# Patient Record
Sex: MALE | Race: WHITE | NOT HISPANIC OR LATINO | Employment: FULL TIME | URBAN - METROPOLITAN AREA
[De-identification: names, ages, dates, MRNs, and addresses within clinical notes are randomized per-mention and may not be internally consistent; named-entity substitution may affect disease eponyms.]

---

## 2017-05-15 ENCOUNTER — HOSPITAL ENCOUNTER (OUTPATIENT)
Dept: LAB | Facility: MEDICAL CENTER | Age: 52
End: 2017-05-15
Attending: INTERNAL MEDICINE
Payer: COMMERCIAL

## 2017-05-15 DIAGNOSIS — E78.5 DYSLIPIDEMIA: Chronic | ICD-10-CM

## 2017-05-15 DIAGNOSIS — E11.9 CONTROLLED TYPE 2 DIABETES MELLITUS WITHOUT COMPLICATION, WITH LONG-TERM CURRENT USE OF INSULIN (HCC): ICD-10-CM

## 2017-05-15 DIAGNOSIS — Z79.4 CONTROLLED TYPE 2 DIABETES MELLITUS WITHOUT COMPLICATION, WITH LONG-TERM CURRENT USE OF INSULIN (HCC): ICD-10-CM

## 2017-05-15 LAB
ALBUMIN SERPL BCP-MCNC: 4.9 G/DL (ref 3.2–4.9)
ALBUMIN/GLOB SERPL: 1.7 G/DL
ALP SERPL-CCNC: 55 U/L (ref 30–99)
ALT SERPL-CCNC: 23 U/L (ref 2–50)
ANION GAP SERPL CALC-SCNC: 9 MMOL/L (ref 0–11.9)
AST SERPL-CCNC: 17 U/L (ref 12–45)
BILIRUB SERPL-MCNC: 0.5 MG/DL (ref 0.1–1.5)
BUN SERPL-MCNC: 11 MG/DL (ref 8–22)
CALCIUM SERPL-MCNC: 10.7 MG/DL (ref 8.5–10.5)
CHLORIDE SERPL-SCNC: 102 MMOL/L (ref 96–112)
CHOLEST SERPL-MCNC: 122 MG/DL (ref 100–199)
CO2 SERPL-SCNC: 26 MMOL/L (ref 20–33)
CREAT SERPL-MCNC: 1.01 MG/DL (ref 0.5–1.4)
CREAT UR-MCNC: 38.2 MG/DL
EST. AVERAGE GLUCOSE BLD GHB EST-MCNC: 154 MG/DL
GFR SERPL CREATININE-BSD FRML MDRD: >60 ML/MIN/1.73 M 2
GLOBULIN SER CALC-MCNC: 2.9 G/DL (ref 1.9–3.5)
GLUCOSE SERPL-MCNC: 122 MG/DL (ref 65–99)
HBA1C MFR BLD: 7 % (ref 0–5.6)
HDLC SERPL-MCNC: 42 MG/DL
LDLC SERPL CALC-MCNC: 58 MG/DL
MICROALBUMIN UR-MCNC: <0.7 MG/DL
MICROALBUMIN/CREAT UR: NORMAL MG/G (ref 0–30)
POTASSIUM SERPL-SCNC: 4.6 MMOL/L (ref 3.6–5.5)
PROT SERPL-MCNC: 7.8 G/DL (ref 6–8.2)
SODIUM SERPL-SCNC: 137 MMOL/L (ref 135–145)
TRIGL SERPL-MCNC: 111 MG/DL (ref 0–149)

## 2017-05-15 PROCEDURE — 36415 COLL VENOUS BLD VENIPUNCTURE: CPT

## 2017-05-15 PROCEDURE — 82570 ASSAY OF URINE CREATININE: CPT

## 2017-05-15 PROCEDURE — 80061 LIPID PANEL: CPT

## 2017-05-15 PROCEDURE — 82043 UR ALBUMIN QUANTITATIVE: CPT

## 2017-05-15 PROCEDURE — 80053 COMPREHEN METABOLIC PANEL: CPT

## 2017-05-15 PROCEDURE — 83036 HEMOGLOBIN GLYCOSYLATED A1C: CPT

## 2017-05-19 ENCOUNTER — OFFICE VISIT (OUTPATIENT)
Dept: MEDICAL GROUP | Facility: MEDICAL CENTER | Age: 52
End: 2017-05-19
Payer: COMMERCIAL

## 2017-05-19 VITALS
WEIGHT: 265 LBS | DIASTOLIC BLOOD PRESSURE: 64 MMHG | OXYGEN SATURATION: 100 % | HEART RATE: 82 BPM | RESPIRATION RATE: 16 BRPM | TEMPERATURE: 98.2 F | SYSTOLIC BLOOD PRESSURE: 122 MMHG | BODY MASS INDEX: 37.94 KG/M2 | HEIGHT: 70 IN

## 2017-05-19 DIAGNOSIS — I10 HTN (HYPERTENSION), BENIGN: ICD-10-CM

## 2017-05-19 DIAGNOSIS — E11.9 CONTROLLED TYPE 2 DIABETES MELLITUS WITHOUT COMPLICATION, WITH LONG-TERM CURRENT USE OF INSULIN (HCC): ICD-10-CM

## 2017-05-19 DIAGNOSIS — E78.5 DYSLIPIDEMIA: Chronic | ICD-10-CM

## 2017-05-19 DIAGNOSIS — E83.52 HYPERCALCEMIA: ICD-10-CM

## 2017-05-19 DIAGNOSIS — Z79.4 CONTROLLED TYPE 2 DIABETES MELLITUS WITHOUT COMPLICATION, WITH LONG-TERM CURRENT USE OF INSULIN (HCC): ICD-10-CM

## 2017-05-19 PROCEDURE — 99214 OFFICE O/P EST MOD 30 MIN: CPT | Performed by: INTERNAL MEDICINE

## 2017-05-19 NOTE — PROGRESS NOTES
CC: Follow-up multiple issues    HPI:   Avinash presents today with the following.    1. Controlled type 2 diabetes mellitus without complication, with long-term current use of insulin (CMS-HCC)  Presents showing good control diabetes with A1c of 7.0 maintain on 3 drug regimen without side effects.    2. Hypercalcemia  Calcium was slightly elevated 10.7. He's never had abnormalities in the past. Denying any significant symptoms associated.    3. HTN (hypertension), benign  Blood pressure well controlled on current medication denying any chest pain or shortness of breath no edema.    4. Dyslipidemia  Maintain onset and without myalgia good control cholesterol.          Patient Active Problem List    Diagnosis Date Noted   • Erectile dysfunction due to arterial insufficiency 11/18/2016   • BMI 39.0-39.9,adult 10/13/2015   • AR (allergic rhinitis) 07/11/2012   • HTN (hypertension), benign 03/05/2012   • Controlled type 2 diabetes mellitus without complication, with long-term current use of insulin (CMS-HCC) 04/30/2010   • Dyslipidemia 04/30/2010   • LAMAR (obstructive sleep apnea) 04/30/2010       Current Outpatient Prescriptions   Medication Sig Dispense Refill   • metformin (GLUCOPHAGE) 1000 MG tablet Take 1 tablet by mouth two  times daily with meals 180 Tab 3   • lisinopril (PRINIVIL, ZESTRIL) 40 MG tablet Take 1 tablet by mouth  daily 90 Tab 3   • VICTOZA 18 MG/3ML Solution Pen-injector injection Inject 1.8mg subcutaneously as instructed every day. 27 mL 6   • ONETOUCH VERIO strip      • sildenafil citrate (VIAGRA) 100 MG tablet Take 1 Tab by mouth as needed for Erectile Dysfunction. 6 Tab 6   • Insulin Pen Needle (B-D UF III MINI PEN NEEDLES) 31G X 5 MM Misc 1 Each by Does not apply route every day. 300 Each 3   • VENTOLIN  (90 BASE) MCG/ACT Aero Soln inhalation aerosol Use 2 puffs every 6 hours  as needed for shortness of  breath 72 g 3   • Blood Glucose Monitoring Suppl SUPPLIES Misc Test strips order:  "Test strips for One Touch Verio IQ meter. Sig: use bid and prn ssx high or low sugar #100 RF x 11 100 Each 11   • Empagliflozin (JARDIANCE) 25 MG Tab Take 25 mg by mouth every day. 30 Tab 6   • simvastatin (ZOCOR) 40 MG Tab Take 1 tablet by mouth  every evening 90 Tab 3   • clobetasol (TEMOVATE) 0.05 % CREA Apply 1 Application to affected area(s) 2 times a day. 180 g 3     No current facility-administered medications for this visit.         Allergies as of 05/19/2017 - Vega as Reviewed 05/19/2017   Allergen Reaction Noted   • Codeine Anaphylaxis 04/30/2010        ROS: As per HPI.    /64 mmHg  Pulse 82  Temp(Src) 36.8 °C (98.2 °F)  Resp 16  Ht 1.778 m (5' 10\")  Wt 120.203 kg (265 lb)  BMI 38.02 kg/m2  SpO2 100%    Physical Exam:  Gen:         Alert and oriented, No apparent distress.  Neck:        No Lymphadenopathy or Bruits.  Lungs:     Clear to auscultation bilaterally  CV:          Regular rate and rhythm. No murmurs, rubs or gallops.               Ext:          No clubbing, cyanosis, edema.      Assessment and Plan.   51 y.o. male with the following issues.    1. Controlled type 2 diabetes mellitus without complication, with long-term current use of insulin (CMS-[facility])  Currently well controlled no changes. Discussed diet and exercise recheck A1c in 6 months. Reminded about yearly eye exam.  - HEMOGLOBIN A1C; Future    2. Hypercalcemia  Sending for repeat with PTH  - COMP METABOLIC PANEL; Future  - PTH INTACT; Future    3. HTN (hypertension), benign  Currently well controlled, Discuss diet, exercise and salt restriction.    4. Dyslipidemia  Lipids currently well controlled. Discussed continued diet and exercise recheck 6 months to 1 year.            "

## 2017-05-19 NOTE — MR AVS SNAPSHOT
"        Avinash Woo   2017 10:00 AM   Office Visit   MRN: [id number]    Department:  [address] [place] [address]   Dept Phone:  [phone]    Description:  Male : 1965   Provider:  Femi Shelton M.D.           Reason for Visit     Follow-Up Labs      Allergies as of 2017     Allergen Noted Reactions    Codeine 2010   Anaphylaxis      You were diagnosed with     Controlled type 2 diabetes mellitus without complication, with long-term current use of insulin (CMS-HCC)   [2255366]       Hypercalcemia   [275.42.ICD-9-CM]       HTN (hypertension), benign   [996980]       Dyslipidemia   [756955]       Need for Tdap vaccination   [571587]         Vital Signs     Blood Pressure Pulse Temperature Respirations Height Weight    122/64 mmHg 82 36.8 °C (98.2 °F) 16 1.778 m (5' 10\") 120.203 kg (265 lb)    Body Mass Index Oxygen Saturation Smoking Status             38.02 kg/m2 100% Never Smoker          Basic Information     Date Of Birth Sex Race Ethnicity Preferred Language    1965 Male White Non- English      Your appointments     2017  8:20 AM   Diabetes Care Visit with Femi Shelton M.D., ALEXANDRE DIABETES RN   [facility] [address]    [facility] [address] [place] [phone]           You will be receiving a confirmation call a few days before your appointment from our automated call confirmation system.              Problem List              ICD-10-CM Priority Class Noted - Resolved    Controlled type 2 diabetes mellitus without complication, with long-term current use of insulin (CMS-HCC) E11.9, Z79.4   2010 - Present    Dyslipidemia (Chronic) E78.5   2010 - Present    LAMAR (obstructive sleep apnea) (Chronic) G47.33   2010 - Present    HTN (hypertension), benign I10   3/5/2012 - Present    AR (allergic rhinitis) J30.9   2012 - Present    BMI 39.0-39.9,adult Z68.39   10/13/2015 - Present    Erectile dysfunction due to " arterial insufficiency N52.01   11/18/2016 - Present      Health Maintenance        Date Due Completion Dates    IMM HEP B VACCINE (1 of 3 - Primary Series) 1965 ---    IMM DTaP/Tdap/Td Vaccine (1 - Tdap) 11/24/1983 ---    DIABETES MONOFILAMENT / LE EXAM 8/12/2017 8/12/2016, 7/17/2015, 1/23/2014, 7/11/2012 (Done)    Override on 7/11/2012: Done    RETINAL SCREENING 10/13/2017 10/13/2016, 10/23/2015, 9/18/2014    A1C SCREENING 11/15/2017 5/15/2017, 11/15/2016, 3/30/2016, 7/16/2015, 12/12/2014, 6/27/2014, 1/9/2014, 10/24/2013, 1/18/2013 (Done), 6/19/2012 (Done), 8/16/2011    Override on 1/18/2013: Done (8.0)    Override on 6/19/2012: Done    FASTING LIPID PROFILE 5/15/2018 5/15/2017, 3/30/2016, 12/12/2014, 1/9/2014, 10/24/2013, 6/19/2012 (Done), 8/16/2011    Override on 6/19/2012: Done    URINE ACR / MICROALBUMIN 5/15/2018 5/15/2017, 3/30/2016, 12/12/2014, 1/9/2014, 10/24/2013, 1/28/2013 (Done), 7/11/2012 (Done)    Override on 1/28/2013: Done    Override on 7/11/2012: Done    SERUM CREATININE 5/15/2018 5/15/2017, 3/30/2016, 12/12/2014, 1/9/2014, 10/24/2013, 1/28/2013 (Done), 6/19/2012 (Done), 8/16/2011    Override on 1/28/2013: Done    Override on 6/19/2012: Done    COLONOSCOPY 2/6/2020 2/6/2015            Current Immunizations     Influenza TIV (IM) 10/20/2016, 10/9/2012, 11/19/2011    Influenza Vaccine Quad Inj (Preserved) 12/24/2014    Pneumococcal polysaccharide vaccine (PPSV-23) 1/23/2014  3:00 PM      Below and/or attached are the medications your provider expects you to take. Review all of your home medications and newly ordered medications with your provider and/or pharmacist. Follow medication instructions as directed by your provider and/or pharmacist. Please keep your medication list with you and share with your provider. Update the information when medications are discontinued, doses are changed, or new medications (including over-the-counter products) are added; and carry medication information at  all times in the event of emergency situations     Allergies:  CODEINE - Anaphylaxis               Medications  Valid as of: May 19, 2017 - 10:28 AM    Generic Name Brand Name Tablet Size Instructions for use    Albuterol Sulfate (Aero Soln) VENTOLIN  (90 BASE) MCG/ACT Use 2 puffs every 6 hours  as needed for shortness of  breath        Blood Glucose Monitoring Suppl (Misc) Blood Glucose Monitoring Suppl SUPPLIES Test strips order: Test strips for One Touch Verio IQ meter. Sig: use bid and prn ssx high or low sugar #100 RF x 11        Clobetasol Propionate (Cream) TEMOVATE 0.05 % Apply 1 Application to affected area(s) 2 times a day.        Empagliflozin (Tab) Empagliflozin 25 MG Take 25 mg by mouth every day.        Glucose Blood (Strip) ONETOUCH VERIO          Insulin Pen Needle (Misc) Insulin Pen Needle 31G X 5 MM 1 Each by Does not apply route every day.        Liraglutide (Solution Pen-injector) VICTOZA 18 MG/3ML Inject 1.8mg subcutaneously as instructed every day.        Lisinopril (Tab) PRINIVIL, ZESTRIL 40 MG Take 1 tablet by mouth  daily        MetFORMIN HCl (Tab) GLUCOPHAGE 1000 MG Take 1 tablet by mouth two  times daily with meals        Sildenafil Citrate (Tab) VIAGRA 100 MG Take 1 Tab by mouth as needed for Erectile Dysfunction.        Simvastatin (Tab) ZOCOR 40 MG Take 1 tablet by mouth  every evening        .                 Medicines prescribed today were sent to:     [facility] - [address] [facility] [address]    Phone: [phone] Fax: [phone]    Open 24 Hours?: No    [facility] #[id number] - [address] AT [address] [address]    Phone: [phone] Fax: [phone]    Open 24 Hours?: No      Medication refill instructions:       If your prescription bottle indicates you have medication refills left, it is not necessary to call your provider’s office. Please contact your  pharmacy and they will refill your medication.    If your prescription bottle indicates you do not have any refills left, you may request refills at any time through one of the following ways: The online [employer] system (except Urgent Care), by calling your provider’s office, or by asking your pharmacy to contact your provider’s office with a refill request. Medication refills are processed only during regular business hours and may not be available until the next business day. Your provider may request additional information or to have a follow-up visit with you prior to refilling your medication.   *Please Note: Medication refills are assigned a new Rx number when refilled electronically. Your pharmacy may indicate that no refills were authorized even though a new prescription for the same medication is available at the pharmacy. Please request the medicine by name with the pharmacy before contacting your provider for a refill.        Your To Do List     Future Labs/Procedures Complete By Expires    COMP METABOLIC PANEL  As directed 5/20/2018    HEMOGLOBIN A1C  As directed 5/20/2018    PTH INTACT  As directed 5/20/2018         [employer] Access Code: Activation code not generated  Current [employer] Status: Active

## 2017-08-28 DIAGNOSIS — E78.5 DYSLIPIDEMIA: Chronic | ICD-10-CM

## 2017-08-28 RX ORDER — SIMVASTATIN 40 MG
40 TABLET ORAL EVERY EVENING
Qty: 90 TAB | Refills: 3 | Status: SHIPPED | OUTPATIENT
Start: 2017-08-28 | End: 2018-08-23 | Stop reason: SDUPTHER

## 2018-02-15 RX ORDER — EMPAGLIFLOZIN 25 MG/1
TABLET, FILM COATED ORAL
Qty: 90 TAB | Refills: 9 | Status: SHIPPED | OUTPATIENT
Start: 2018-02-15 | End: 2018-05-23 | Stop reason: SDUPTHER

## 2018-02-15 RX ORDER — FLURBIPROFEN SODIUM 0.3 MG/ML
SOLUTION/ DROPS OPHTHALMIC
Qty: 90 EACH | Refills: 11 | Status: SHIPPED | OUTPATIENT
Start: 2018-02-15

## 2018-02-15 RX ORDER — LIRAGLUTIDE 6 MG/ML
INJECTION SUBCUTANEOUS
Qty: 27 ML | Refills: 0 | Status: SHIPPED | OUTPATIENT
Start: 2018-02-15 | End: 2018-03-27 | Stop reason: SDUPTHER

## 2018-02-15 RX ORDER — ALBUTEROL SULFATE 90 UG/1
AEROSOL, METERED RESPIRATORY (INHALATION)
Qty: 54 G | Refills: 11 | Status: SHIPPED | OUTPATIENT
Start: 2018-02-15 | End: 2019-10-03 | Stop reason: SDUPTHER

## 2018-02-15 RX ORDER — LISINOPRIL 40 MG/1
TABLET ORAL
Qty: 90 TAB | Refills: 0 | Status: SHIPPED | OUTPATIENT
Start: 2018-02-15 | End: 2018-04-08 | Stop reason: SDUPTHER

## 2018-04-09 RX ORDER — LISINOPRIL 40 MG/1
TABLET ORAL
Qty: 30 TAB | Refills: 0 | Status: SHIPPED | OUTPATIENT
Start: 2018-04-09 | End: 2018-05-23 | Stop reason: SDUPTHER

## 2018-04-13 ENCOUNTER — HOSPITAL ENCOUNTER (OUTPATIENT)
Dept: LAB | Facility: MEDICAL CENTER | Age: 53
End: 2018-04-13
Attending: INTERNAL MEDICINE
Payer: COMMERCIAL

## 2018-04-13 DIAGNOSIS — E83.52 HYPERCALCEMIA: ICD-10-CM

## 2018-04-13 DIAGNOSIS — Z79.4 CONTROLLED TYPE 2 DIABETES MELLITUS WITHOUT COMPLICATION, WITH LONG-TERM CURRENT USE OF INSULIN (HCC): ICD-10-CM

## 2018-04-13 DIAGNOSIS — E11.9 CONTROLLED TYPE 2 DIABETES MELLITUS WITHOUT COMPLICATION, WITH LONG-TERM CURRENT USE OF INSULIN (HCC): ICD-10-CM

## 2018-04-13 DIAGNOSIS — E78.5 DYSLIPIDEMIA: Chronic | ICD-10-CM

## 2018-04-13 LAB
ALBUMIN SERPL BCP-MCNC: 4.6 G/DL (ref 3.2–4.9)
ALBUMIN/GLOB SERPL: 1.7 G/DL
ALP SERPL-CCNC: 59 U/L (ref 30–99)
ALT SERPL-CCNC: 26 U/L (ref 2–50)
ANION GAP SERPL CALC-SCNC: 11 MMOL/L (ref 0–11.9)
AST SERPL-CCNC: 19 U/L (ref 12–45)
BILIRUB SERPL-MCNC: 0.4 MG/DL (ref 0.1–1.5)
BUN SERPL-MCNC: 17 MG/DL (ref 8–22)
CALCIUM SERPL-MCNC: 9.6 MG/DL (ref 8.5–10.5)
CHLORIDE SERPL-SCNC: 102 MMOL/L (ref 96–112)
CO2 SERPL-SCNC: 23 MMOL/L (ref 20–33)
CREAT SERPL-MCNC: 0.91 MG/DL (ref 0.5–1.4)
EST. AVERAGE GLUCOSE BLD GHB EST-MCNC: 186 MG/DL
GLOBULIN SER CALC-MCNC: 2.7 G/DL (ref 1.9–3.5)
GLUCOSE SERPL-MCNC: 146 MG/DL (ref 65–99)
HBA1C MFR BLD: 8.1 % (ref 0–5.6)
POTASSIUM SERPL-SCNC: 4.2 MMOL/L (ref 3.6–5.5)
PROT SERPL-MCNC: 7.3 G/DL (ref 6–8.2)
PTH-INTACT SERPL-MCNC: 61.7 PG/ML (ref 14–72)
SODIUM SERPL-SCNC: 136 MMOL/L (ref 135–145)

## 2018-04-13 PROCEDURE — 36415 COLL VENOUS BLD VENIPUNCTURE: CPT

## 2018-04-13 PROCEDURE — 83970 ASSAY OF PARATHORMONE: CPT

## 2018-04-13 PROCEDURE — 83036 HEMOGLOBIN GLYCOSYLATED A1C: CPT

## 2018-04-13 PROCEDURE — 80053 COMPREHEN METABOLIC PANEL: CPT

## 2018-04-23 ENCOUNTER — OFFICE VISIT (OUTPATIENT)
Dept: MEDICAL GROUP | Facility: MEDICAL CENTER | Age: 53
End: 2018-04-23
Payer: COMMERCIAL

## 2018-04-23 VITALS
HEIGHT: 70 IN | BODY MASS INDEX: 39.08 KG/M2 | WEIGHT: 273 LBS | HEART RATE: 74 BPM | TEMPERATURE: 98.6 F | SYSTOLIC BLOOD PRESSURE: 124 MMHG | RESPIRATION RATE: 16 BRPM | OXYGEN SATURATION: 97 % | DIASTOLIC BLOOD PRESSURE: 78 MMHG

## 2018-04-23 DIAGNOSIS — E11.65 TYPE 2 DIABETES MELLITUS WITH HYPERGLYCEMIA, WITHOUT LONG-TERM CURRENT USE OF INSULIN (HCC): ICD-10-CM

## 2018-04-23 DIAGNOSIS — E78.5 DYSLIPIDEMIA: Chronic | ICD-10-CM

## 2018-04-23 DIAGNOSIS — I10 HTN (HYPERTENSION), BENIGN: ICD-10-CM

## 2018-04-23 DIAGNOSIS — G47.33 OSA (OBSTRUCTIVE SLEEP APNEA): Chronic | ICD-10-CM

## 2018-04-23 PROCEDURE — 99214 OFFICE O/P EST MOD 30 MIN: CPT | Performed by: FAMILY MEDICINE

## 2018-04-23 ASSESSMENT — PATIENT HEALTH QUESTIONNAIRE - PHQ9: CLINICAL INTERPRETATION OF PHQ2 SCORE: 0

## 2018-04-23 NOTE — PROGRESS NOTES
cc: Diabetes    Subjective:     Avinash Woo is a 52 y.o. male presenting to discuss his blood sugars. His recent A1c was 8.1. Blood sugars at home have been in the 120s to 140s fasting. His after meal blood sugars range in the 150s to 200s. Denies any hypoglycemic episodes, no side effects from meds. He is currently taking metformin 1000 mg twice a day, jardiance 25 mg daily, Victoza 1.8 mg weekly. He reports that the Victoza is now going to cost him $1000 every 3 months.  His eating habits have been more irregular lately, eating out more often as he has been traveling. He also now works the night shift and his sleep schedule is irregular. He does have a history of sleep apnea, but is untreated for reasons unclear.    Review of systems:  See above.         Current Outpatient Prescriptions:   •  insulin glargine (LANTUS SOLOSTAR) 100 UNIT/ML Solution Pen-injector injection, Inject 10 Units as instructed every evening., Disp: 15 mL, Rfl: 1  •  lisinopril (PRINIVIL, ZESTRIL) 40 MG tablet, TAKE 1 TABLET BY MOUTH  DAILY, Disp: 30 Tab, Rfl: 0  •  metformin (GLUCOPHAGE) 1000 MG tablet, TAKE 1 TABLET BY MOUTH TWO  TIMES DAILY WITH MEALS, Disp: 60 Tab, Rfl: 0  •  VICTOZA 18 MG/3ML Solution Pen-injector injection, INJECT 1.8MG SUBCUTANEOUSLY AS INSTRUCTED EVERY DAY., Disp: 18 mL, Rfl: 0  •  JARDIANCE 25 MG Tab, TAKE 1 TABLET BY MOUTH  DAILY, Disp: 90 Tab, Rfl: 9  •  VENTOLIN  (90 Base) MCG/ACT Aero Soln inhalation aerosol, USE 2 PUFFS EVERY 6 HOURS  AS NEEDED FOR SHORTNESS OF  BREATH, Disp: 54 g, Rfl: 11  •  B-D UF III MINI PEN NEEDLES 31G X 5 MM Misc, USE 1 EACH EVERY DAY, Disp: 90 Each, Rfl: 11  •  simvastatin (ZOCOR) 40 MG Tab, Take 1 Tab by mouth every evening., Disp: 90 Tab, Rfl: 3  •  ONETOUCH VERIO strip, , Disp: , Rfl:   •  Blood Glucose Monitoring Suppl SUPPLIES Misc, Test strips order: Test strips for One Touch Verio IQ meter. Sig: use bid and prn ssx high or low sugar #100 RF x 11, Disp: 100 Each, Rfl:  "11    Allergies   Allergen Reactions   • Codeine Anaphylaxis       Past Medical History:   Diagnosis Date   • DM (diabetes mellitus) (CMS-HCC) 4/30/2010   • Dyslipidemia 4/30/2010   • LAMAR (obstructive sleep apnea) 4/30/2010     History reviewed. No pertinent surgical history.  Family History   Problem Relation Age of Onset   • Diabetes Father    • Cancer Neg Hx    • Heart Disease Neg Hx      Social History     Social History   • Marital status:      Spouse name: N/A   • Number of children: N/A   • Years of education: N/A     Occupational History   • Not on file.     Social History Main Topics   • Smoking status: Never Smoker   • Smokeless tobacco: Never Used   • Alcohol use No   • Drug use: No   • Sexual activity: Yes     Partners: Female     Other Topics Concern   • Not on file     Social History Narrative   • No narrative on file       Objective:     Vitals: /78   Pulse 74   Temp 37 °C (98.6 °F)   Resp 16   Ht 1.778 m (5' 10\")   Wt 123.8 kg (273 lb)   SpO2 97%   BMI 39.17 kg/m²   General: Alert, pleasant, NAD  HEENT: Normocephalic.    Psych:  Affect/mood is normal, judgement is good, memory is intact, grooming is appropriate.    Assessment/Plan:     Avinash was seen today for diabetes mellitus.    Diagnoses and all orders for this visit:    Type 2 diabetes mellitus with hyperglycemia, without long-term current use of insulin (CMS-HCC)  Long discussion regarding his medication options, healthy diet/exercise, bariatric surgery, lamar treatment. We could try finding a differential glp1 agonist that is better covered by insurance and add glimepiride. Other option would be to start insulin. After discussing with the patient, he would prefer to start insulin. About a week and a half after stopping Victoza, he will start Lantus at 5 units and increase by 2 units every week if his fasting blood sugars are over 140. Continue with metformin and jardiance. He will let us know if he has any questions or " concerns, he was not interested in dm education referral. Discussed avoiding hypoglycemic episodes, f/u in 4 weeks.  -     REFERRAL TO BARIATRIC SURGERY  -     insulin glargine (LANTUS SOLOSTAR) 100 UNIT/ML Solution Pen-injector injection; Inject 10 Units as instructed every evening.    HTN (hypertension), benign  Stable, continue current medication  -     REFERRAL TO BARIATRIC SURGERY    Dyslipidemia  Stable, continue current medication  -     REFERRAL TO BARIATRIC SURGERY    LAMAR (obstructive sleep apnea)  Discuss risks/consequences of untreated sleep apnea. Offered referral to the sleep study/pulmonology, but patient declined for now.  -     REFERRAL TO BARIATRIC SURGERY    BMI 39.0-39.9,adult  Patient was interested in bariatric surgery, referral placed  -     REFERRAL TO BARIATRIC SURGERY  -     Patient identified as having weight management issue.  Appropriate orders and counseling given.        Return in about 4 weeks (around 5/21/2018) for Diabetes.

## 2018-05-23 ENCOUNTER — OFFICE VISIT (OUTPATIENT)
Dept: MEDICAL GROUP | Facility: MEDICAL CENTER | Age: 53
End: 2018-05-23
Payer: COMMERCIAL

## 2018-05-23 VITALS
SYSTOLIC BLOOD PRESSURE: 128 MMHG | WEIGHT: 270 LBS | TEMPERATURE: 98.7 F | DIASTOLIC BLOOD PRESSURE: 62 MMHG | HEIGHT: 70 IN | OXYGEN SATURATION: 94 % | RESPIRATION RATE: 16 BRPM | HEART RATE: 85 BPM | BODY MASS INDEX: 38.65 KG/M2

## 2018-05-23 DIAGNOSIS — R35.0 BENIGN PROSTATIC HYPERPLASIA WITH URINARY FREQUENCY: ICD-10-CM

## 2018-05-23 DIAGNOSIS — N40.1 BENIGN PROSTATIC HYPERPLASIA WITH URINARY FREQUENCY: ICD-10-CM

## 2018-05-23 DIAGNOSIS — Z79.4 CONTROLLED TYPE 2 DIABETES MELLITUS WITHOUT COMPLICATION, WITH LONG-TERM CURRENT USE OF INSULIN (HCC): ICD-10-CM

## 2018-05-23 DIAGNOSIS — I10 HTN (HYPERTENSION), BENIGN: ICD-10-CM

## 2018-05-23 DIAGNOSIS — E11.9 CONTROLLED TYPE 2 DIABETES MELLITUS WITHOUT COMPLICATION, WITH LONG-TERM CURRENT USE OF INSULIN (HCC): ICD-10-CM

## 2018-05-23 PROCEDURE — 99214 OFFICE O/P EST MOD 30 MIN: CPT | Performed by: INTERNAL MEDICINE

## 2018-05-23 RX ORDER — LIRAGLUTIDE 6 MG/ML
INJECTION SUBCUTANEOUS
COMMUNITY
Start: 2018-04-26 | End: 2018-06-11 | Stop reason: SDUPTHER

## 2018-05-23 RX ORDER — LISINOPRIL 40 MG/1
40 TABLET ORAL DAILY
Qty: 90 TAB | Refills: 3 | Status: SHIPPED | OUTPATIENT
Start: 2018-05-23 | End: 2019-10-04 | Stop reason: SDUPTHER

## 2018-06-11 RX ORDER — LIRAGLUTIDE 6 MG/ML
1.8 INJECTION SUBCUTANEOUS DAILY
Qty: 9 PEN | Refills: 3 | Status: SHIPPED | OUTPATIENT
Start: 2018-06-11 | End: 2019-10-03 | Stop reason: SDUPTHER

## 2018-08-23 ENCOUNTER — OFFICE VISIT (OUTPATIENT)
Dept: MEDICAL GROUP | Facility: MEDICAL CENTER | Age: 53
End: 2018-08-23
Payer: COMMERCIAL

## 2018-08-23 VITALS
BODY MASS INDEX: 36.79 KG/M2 | SYSTOLIC BLOOD PRESSURE: 110 MMHG | WEIGHT: 257 LBS | TEMPERATURE: 98.3 F | OXYGEN SATURATION: 95 % | DIASTOLIC BLOOD PRESSURE: 64 MMHG | RESPIRATION RATE: 16 BRPM | HEART RATE: 75 BPM | HEIGHT: 70 IN

## 2018-08-23 DIAGNOSIS — Z79.4 CONTROLLED TYPE 2 DIABETES MELLITUS WITHOUT COMPLICATION, WITH LONG-TERM CURRENT USE OF INSULIN (HCC): ICD-10-CM

## 2018-08-23 DIAGNOSIS — E11.9 CONTROLLED TYPE 2 DIABETES MELLITUS WITHOUT COMPLICATION, WITH LONG-TERM CURRENT USE OF INSULIN (HCC): ICD-10-CM

## 2018-08-23 DIAGNOSIS — I10 HTN (HYPERTENSION), BENIGN: ICD-10-CM

## 2018-08-23 DIAGNOSIS — E78.5 DYSLIPIDEMIA: Chronic | ICD-10-CM

## 2018-08-23 LAB
HBA1C MFR BLD: 6.5 % (ref ?–5.8)
INT CON NEG: NEGATIVE
INT CON POS: POSITIVE

## 2018-08-23 PROCEDURE — 99214 OFFICE O/P EST MOD 30 MIN: CPT | Performed by: INTERNAL MEDICINE

## 2018-08-23 PROCEDURE — 83036 HEMOGLOBIN GLYCOSYLATED A1C: CPT | Performed by: INTERNAL MEDICINE

## 2018-08-23 RX ORDER — SIMVASTATIN 40 MG
40 TABLET ORAL EVERY EVENING
Qty: 90 TAB | Refills: 3 | Status: SHIPPED | OUTPATIENT
Start: 2018-08-23 | End: 2019-10-03 | Stop reason: SDUPTHER

## 2018-08-23 NOTE — PROGRESS NOTES
CC: Follow-up diabetes, dyslipidemia, hypertension.    HPI:   Avinash presents today with the following.    1. Controlled type 2 diabetes mellitus without complication, with long-term current use of insulin (HCC)  Presents after having blood work last A1c was 8.1 checked in office today down to 6.5.  Reports significant improved diet is compliant with medications now.  Denies any hypoglycemia.    2. Dyslipidemia  Cholesterol well controlled he is coming due for refills of medication.  He will be due for another check of cholesterol at next lab draw    3. HTN (hypertension), benign  Well-controlled on current medications denying any dizziness.      Patient Active Problem List    Diagnosis Date Noted   • Benign prostatic hyperplasia with urinary frequency 05/23/2018   • Erectile dysfunction due to arterial insufficiency 11/18/2016   • BMI 39.0-39.9,adult 10/13/2015   • AR (allergic rhinitis) 07/11/2012   • HTN (hypertension), benign 03/05/2012   • Controlled type 2 diabetes mellitus without complication, with long-term current use of insulin (HCC) 04/30/2010   • Dyslipidemia 04/30/2010   • LAMAR (obstructive sleep apnea) 04/30/2010       Current Outpatient Prescriptions   Medication Sig Dispense Refill   • simvastatin (ZOCOR) 40 MG Tab Take 1 Tab by mouth every evening. 90 Tab 3   • VICTOZA 18 MG/3ML Solution Pen-injector injection Inject 0.3 mL as instructed every day. 9 PEN 3   • lisinopril (PRINIVIL, ZESTRIL) 40 MG tablet Take 1 Tab by mouth every day. TAKE 1 TABLET BY MOUTH DAILY 90 Tab 3   • metformin (GLUCOPHAGE) 1000 MG tablet Take 1 Tab by mouth 2 times a day. 180 Tab 3   • Empagliflozin (JARDIANCE) 25 MG Tab Take 25 mg by mouth every day. TAKE 1 TABLET BY MOUTH DAILY 90 Tab 9   • VENTOLIN  (90 Base) MCG/ACT Aero Soln inhalation aerosol USE 2 PUFFS EVERY 6 HOURS  AS NEEDED FOR SHORTNESS OF  BREATH 54 g 11   • B-D UF III MINI PEN NEEDLES 31G X 5 MM Misc USE 1 EACH EVERY DAY 90 Each 11   • ONETOUCH VERIO  "strip      • Blood Glucose Monitoring Suppl SUPPLIES Misc Test strips order: Test strips for One Touch Verio IQ meter. Sig: use bid and prn ssx high or low sugar #100 RF x 11 100 Each 11     No current facility-administered medications for this visit.          Allergies as of 08/23/2018 - Reviewed 08/23/2018   Allergen Reaction Noted   • Codeine Anaphylaxis 04/30/2010        ROS: Denies Chest pain, SOB, LE edema.    /64   Pulse 75   Temp 36.8 °C (98.3 °F)   Resp 16   Ht 1.778 m (5' 10\")   Wt 116.6 kg (257 lb)   SpO2 95%   BMI 36.88 kg/m²     Physical Exam:  Gen:         Alert and oriented, No apparent distress.  Neck:        No Lymphadenopathy or Bruits.  Lungs:     Clear to auscultation bilaterally  CV:          Regular rate and rhythm. No murmurs, rubs or gallops.               Ext:          No clubbing, cyanosis, edema.      Assessment and Plan.   52 y.o. male with the following issues.    1. Controlled type 2 diabetes mellitus without complication, with long-term current use of insulin (HCC)  Currently well controlled no changes. Discussed diet and exercise recheck A1c in 6 months. Reminded about yearly eye exam.  - POCT  A1C  - HEMOGLOBIN A1C; Future  - MICROALBUMIN CREAT RATIO URINE; Future    2. Dyslipidemia  Refilled statin  - COMP METABOLIC PANEL; Future  - LIPID PROFILE; Future  - simvastatin (ZOCOR) 40 MG Tab; Take 1 Tab by mouth every evening.  Dispense: 90 Tab; Refill: 3    3. HTN (hypertension), benign  Currently well controlled, Discuss diet, exercise and salt restriction.  No change to medication therapy.      "

## 2018-11-01 ENCOUNTER — PATIENT MESSAGE (OUTPATIENT)
Dept: HEALTH INFORMATION MANAGEMENT | Facility: OTHER | Age: 53
End: 2018-11-01

## 2019-06-07 ENCOUNTER — OFFICE VISIT (OUTPATIENT)
Dept: URGENT CARE | Facility: PHYSICIAN GROUP | Age: 54
End: 2019-06-07
Payer: COMMERCIAL

## 2019-06-07 ENCOUNTER — HOSPITAL ENCOUNTER (OUTPATIENT)
Dept: RADIOLOGY | Facility: MEDICAL CENTER | Age: 54
End: 2019-06-07
Attending: NURSE PRACTITIONER
Payer: COMMERCIAL

## 2019-06-07 VITALS
WEIGHT: 260 LBS | TEMPERATURE: 96.5 F | DIASTOLIC BLOOD PRESSURE: 90 MMHG | OXYGEN SATURATION: 96 % | BODY MASS INDEX: 37.22 KG/M2 | SYSTOLIC BLOOD PRESSURE: 142 MMHG | HEART RATE: 92 BPM | HEIGHT: 70 IN

## 2019-06-07 DIAGNOSIS — M25.511 ACUTE PAIN OF RIGHT SHOULDER: ICD-10-CM

## 2019-06-07 DIAGNOSIS — S49.91XA INJURY OF RIGHT SHOULDER, INITIAL ENCOUNTER: Primary | ICD-10-CM

## 2019-06-07 DIAGNOSIS — S49.91XA INJURY OF RIGHT SHOULDER, INITIAL ENCOUNTER: ICD-10-CM

## 2019-06-07 PROCEDURE — 99214 OFFICE O/P EST MOD 30 MIN: CPT | Performed by: NURSE PRACTITIONER

## 2019-06-07 PROCEDURE — 73030 X-RAY EXAM OF SHOULDER: CPT | Mod: RT

## 2019-06-07 ASSESSMENT — ENCOUNTER SYMPTOMS
WEAKNESS: 0
CARDIOVASCULAR NEGATIVE: 1
CONSTITUTIONAL NEGATIVE: 1
NUMBNESS: 0
TINGLING: 0
NEUROLOGICAL NEGATIVE: 1
SHORTNESS OF BREATH: 0
RESPIRATORY NEGATIVE: 1
SENSORY CHANGE: 0
FOCAL WEAKNESS: 0

## 2019-06-07 NOTE — PROGRESS NOTES
Subjective:     Avinash Woo is a 53 y.o. male who presents for Shoulder Pain (Right side post fall)       Shoulder Pain   This is a new problem. The problem has been unchanged. Pertinent negatives include no numbness or weakness.     Patient reports that 2 weeks ago he was participating in martial arts when he fell onto his right shoulder onto a mat. Reports right shoulder pain with limited range of motion/weakness. Rates pain as 8/10. Reports some tenderness at his right shoulder joint with pain radiating down his right arm. Denies numbness, tingling, or other symptoms. Has taken acetaminophen and has applied ice. Pain has been about the same. Denies previous injury to the right shoulder.    PMH:  has a past medical history of DM (diabetes mellitus) (HCC) (4/30/2010); Dyslipidemia (4/30/2010); and LAMAR (obstructive sleep apnea) (4/30/2010).    MEDS:   Current Outpatient Prescriptions:   •  simvastatin (ZOCOR) 40 MG Tab, Take 1 Tab by mouth every evening., Disp: 90 Tab, Rfl: 3  •  VICTOZA 18 MG/3ML Solution Pen-injector injection, Inject 0.3 mL as instructed every day., Disp: 9 PEN, Rfl: 3  •  lisinopril (PRINIVIL, ZESTRIL) 40 MG tablet, Take 1 Tab by mouth every day. TAKE 1 TABLET BY MOUTH DAILY, Disp: 90 Tab, Rfl: 3  •  metformin (GLUCOPHAGE) 1000 MG tablet, Take 1 Tab by mouth 2 times a day., Disp: 180 Tab, Rfl: 3  •  Empagliflozin (JARDIANCE) 25 MG Tab, Take 25 mg by mouth every day. TAKE 1 TABLET BY MOUTH DAILY, Disp: 90 Tab, Rfl: 9  •  VENTOLIN  (90 Base) MCG/ACT Aero Soln inhalation aerosol, USE 2 PUFFS EVERY 6 HOURS  AS NEEDED FOR SHORTNESS OF  BREATH, Disp: 54 g, Rfl: 11  •  B-D UF III MINI PEN NEEDLES 31G X 5 MM Misc, USE 1 EACH EVERY DAY, Disp: 90 Each, Rfl: 11  •  ONETOUCH VERIO strip, , Disp: , Rfl:   •  Blood Glucose Monitoring Suppl SUPPLIES Misc, Test strips order: Test strips for One Touch Verio IQ meter. Sig: use bid and prn ssx high or low sugar #100 RF x 11, Disp: 100 Each, Rfl:  "11    ALLERGIES:   Allergies   Allergen Reactions   • Codeine Anaphylaxis     SURGHX: No past surgical history on file.    SOCHX:  reports that he has never smoked. He has never used smokeless tobacco. He reports that he does not drink alcohol or use drugs.     FH: Reviewed with patient, not pertinent to this visit.     Review of Systems   Constitutional: Negative.  Negative for malaise/fatigue.   Respiratory: Negative.  Negative for shortness of breath.    Cardiovascular: Negative.    Musculoskeletal:        Right shoulder injury, pain   Neurological: Negative.  Negative for tingling, sensory change, focal weakness, weakness and numbness.   All other systems reviewed and are negative.    Objective:     /90   Pulse 92   Temp 35.8 °C (96.5 °F)   Ht 1.778 m (5' 10\")   Wt 117.9 kg (260 lb)   SpO2 96%   BMI 37.31 kg/m²     Physical Exam   Constitutional: He is oriented to person, place, and time. He appears well-developed and well-nourished. He is cooperative.  Non-toxic appearance. No distress.   HENT:   Right Ear: External ear normal.   Left Ear: External ear normal.   Nose: Nose normal.   Mouth/Throat: Mucous membranes are normal.   Eyes: Conjunctivae and EOM are normal.   Neck: Normal range of motion.   Cardiovascular: Normal rate, regular rhythm, normal heart sounds and normal pulses.    Pulmonary/Chest: Effort normal and breath sounds normal. No respiratory distress. He has no decreased breath sounds.   Abdominal: Bowel sounds are normal.   Musculoskeletal: He exhibits no deformity.        Right shoulder: He exhibits decreased range of motion, tenderness and pain. He exhibits no swelling, no deformity and no laceration.   Neurological: He is alert and oriented to person, place, and time. He has normal strength. No sensory deficit.   Skin: Skin is warm, dry and intact. Capillary refill takes less than 2 seconds. No bruising noted.   Psychiatric: He has a normal mood and affect. His behavior is normal. "   Vitals reviewed.    X-ray of right shoulder:    Narrative     6/7/2019 8:32 AM    HISTORY/REASON FOR EXAM:  Right shoulder pain after ground-level fall    TECHNIQUE/EXAM DESCRIPTION AND NUMBER OF VIEWS:  3 views of the RIGHT shoulder.    COMPARISON: None    FINDINGS:  Bone mineralization is normal.  There is no evidence of acute fracture.  There is no evidence of dislocation.  There is mild joint space narrowing and periarticular sclerosis and marginal spurring.  Apparent small calcifications appear to be present near the inferior edge of the glenohumeral joint.     Impression     1.  There is no evidence of acute fracture.    2.  Mild osteoarthritis.    3.  Possible joint space calcifications.     Reading Provider Reading Date   Feliciano Gamez M.D. Jun 7, 2019     Signing Provider Signing Date Signing Time   Feliciano Gamez M.D. Jun 7, 2019  8:48 AM        Assessment/Plan:     1. Injury of right shoulder, initial encounter  - DX-SHOULDER 2+ RIGHT; Future    2. Acute pain of right shoulder    X-ray of right shoulder ordered. Radiology report and images reviewed by myself. Per my interpretation: negative for acute process, no fracture or dislocation.    Discussed close monitoring for resolution of symptoms and RICE and OTC NSAIDs and/or acetaminophen PRN for pain. Pt states he is unable to take ibuprofen. Rx for short course of steroids offered, patient declines at this time. Sling provided for right arm. Declines referral to sports medicine at this time.    Patient advised to: Return for 1) Symptoms don't improve or worsen, or go to ER, 2) Follow up with primary care in 7-10 days.    Differential diagnosis, natural history, supportive care, and indications for immediate follow-up discussed. All questions answered. Patient agrees with the plan of care.

## 2019-07-19 ENCOUNTER — OFFICE VISIT (OUTPATIENT)
Dept: URGENT CARE | Facility: PHYSICIAN GROUP | Age: 54
End: 2019-07-19
Payer: COMMERCIAL

## 2019-07-19 ENCOUNTER — HOSPITAL ENCOUNTER (OUTPATIENT)
Dept: RADIOLOGY | Facility: MEDICAL CENTER | Age: 54
End: 2019-07-19
Attending: EMERGENCY MEDICINE
Payer: COMMERCIAL

## 2019-07-19 VITALS
WEIGHT: 260 LBS | HEIGHT: 70 IN | OXYGEN SATURATION: 96 % | SYSTOLIC BLOOD PRESSURE: 152 MMHG | TEMPERATURE: 97.8 F | BODY MASS INDEX: 37.22 KG/M2 | RESPIRATION RATE: 15 BRPM | HEART RATE: 87 BPM | DIASTOLIC BLOOD PRESSURE: 94 MMHG

## 2019-07-19 DIAGNOSIS — R05.9 COUGH: ICD-10-CM

## 2019-07-19 DIAGNOSIS — J20.9 BRONCHITIS WITH BRONCHOSPASM: ICD-10-CM

## 2019-07-19 PROCEDURE — 99214 OFFICE O/P EST MOD 30 MIN: CPT | Performed by: EMERGENCY MEDICINE

## 2019-07-19 PROCEDURE — 71046 X-RAY EXAM CHEST 2 VIEWS: CPT

## 2019-07-19 RX ORDER — OMEGA-3 FATTY ACIDS/FISH OIL 300-1000MG
CAPSULE ORAL
COMMUNITY
End: 2019-10-24

## 2019-07-19 RX ORDER — BENZONATATE 100 MG/1
100 CAPSULE ORAL 3 TIMES DAILY PRN
Qty: 20 CAP | Refills: 0 | Status: SHIPPED | OUTPATIENT
Start: 2019-07-19 | End: 2019-10-24

## 2019-07-19 RX ORDER — GUAIFENESIN 600 MG/1
600 TABLET, EXTENDED RELEASE ORAL EVERY 12 HOURS
COMMUNITY
End: 2019-10-24

## 2019-07-19 ASSESSMENT — ENCOUNTER SYMPTOMS
WHEEZING: 0
HEMOPTYSIS: 0
SHORTNESS OF BREATH: 0
FEVER: 0
PALPITATIONS: 0
RHINORRHEA: 0
HEARTBURN: 0
CHILLS: 0
SORE THROAT: 0
COUGH: 1
SPUTUM PRODUCTION: 0

## 2019-07-19 NOTE — PROGRESS NOTES
Subjective:      Avinash Woo is a 53 y.o. male who presents with Cough (1-2 weeks)            Cough   This is a new problem. Episode onset: 2 weeks. The problem has been gradually worsening. The problem occurs every few minutes. The cough is non-productive. Pertinent negatives include no chest pain, chills, fever, heartburn, hemoptysis, nasal congestion, postnasal drip, rash, rhinorrhea, sore throat, shortness of breath or wheezing. Nothing aggravates the symptoms. He has tried OTC cough suppressant for the symptoms. The treatment provided no relief. His past medical history is significant for bronchitis and pneumonia.   Notes blood sugar 120s; no significant changes.    Review of Systems   Constitutional: Negative for chills and fever.   HENT: Negative for congestion, nosebleeds, postnasal drip, rhinorrhea and sore throat.    Respiratory: Positive for cough. Negative for hemoptysis, sputum production, shortness of breath and wheezing.    Cardiovascular: Negative for chest pain, palpitations and leg swelling.   Gastrointestinal: Negative for heartburn.   Skin: Negative for rash.       PMH:  has a past medical history of DM (diabetes mellitus) (HCC) (4/30/2010); Dyslipidemia (4/30/2010); and LAMAR (obstructive sleep apnea) (4/30/2010).  MEDS:   Current Outpatient Prescriptions:   •  guaiFENesin LA (MUCINEX) 600 MG TABLET SR 12 HR, Take 600 mg by mouth every 12 hours., Disp: , Rfl:   •  Ibuprofen (ADVIL) 200 MG Cap, Take  by mouth., Disp: , Rfl:   •  simvastatin (ZOCOR) 40 MG Tab, Take 1 Tab by mouth every evening., Disp: 90 Tab, Rfl: 3  •  VICTOZA 18 MG/3ML Solution Pen-injector injection, Inject 0.3 mL as instructed every day., Disp: 9 PEN, Rfl: 3  •  lisinopril (PRINIVIL, ZESTRIL) 40 MG tablet, Take 1 Tab by mouth every day. TAKE 1 TABLET BY MOUTH DAILY, Disp: 90 Tab, Rfl: 3  •  metformin (GLUCOPHAGE) 1000 MG tablet, Take 1 Tab by mouth 2 times a day., Disp: 180 Tab, Rfl: 3  •  Empagliflozin (JARDIANCE) 25 MG Tab,  "Take 25 mg by mouth every day. TAKE 1 TABLET BY MOUTH DAILY, Disp: 90 Tab, Rfl: 9  •  VENTOLIN  (90 Base) MCG/ACT Aero Soln inhalation aerosol, USE 2 PUFFS EVERY 6 HOURS  AS NEEDED FOR SHORTNESS OF  BREATH, Disp: 54 g, Rfl: 11  •  B-D UF III MINI PEN NEEDLES 31G X 5 MM Misc, USE 1 EACH EVERY DAY, Disp: 90 Each, Rfl: 11  •  ONETOUCH VERIO strip, , Disp: , Rfl:   •  Blood Glucose Monitoring Suppl SUPPLIES Misc, Test strips order: Test strips for One Touch Verio IQ meter. Sig: use bid and prn ssx high or low sugar #100 RF x 11, Disp: 100 Each, Rfl: 11  ALLERGIES:   Allergies   Allergen Reactions   • Codeine Anaphylaxis     SURGHX: No past surgical history on file.  SOCHX:  reports that he has never smoked. He has never used smokeless tobacco. He reports that he does not drink alcohol or use drugs.  FH: family history includes Diabetes in his father.     Objective:     /94   Pulse 87   Temp 36.6 °C (97.8 °F)   Resp 15   Ht 1.778 m (5' 10\")   Wt 117.9 kg (260 lb)   SpO2 96%   BMI 37.31 kg/m²      Physical Exam   Constitutional: He is oriented to person, place, and time. He appears well-developed and well-nourished. He is cooperative. He does not appear ill. No distress.   HENT:   Head: Normocephalic.   Right Ear: Hearing and external ear normal.   Left Ear: Hearing and external ear normal.   Nose: No mucosal edema or rhinorrhea.   Mouth/Throat: Uvula is midline. No trismus in the jaw. No uvula swelling. No oropharyngeal exudate, posterior oropharyngeal edema, posterior oropharyngeal erythema or tonsillar abscesses.   Eyes: Conjunctivae and lids are normal.   Neck: Trachea normal and phonation normal. Neck supple. No JVD present.   Cardiovascular: Normal rate, regular rhythm and normal heart sounds.    Pulmonary/Chest: Effort normal and breath sounds normal.   Abdominal: He exhibits no distension. There is no tenderness.   Lymphadenopathy:     He has no cervical adenopathy.   Neurological: He is alert " and oriented to person, place, and time.   Skin: Skin is warm and dry.   Psychiatric: His behavior is normal.   Vitals reviewed.              Assessment/Plan:     1. Bronchitis with bronchospasm  - Albuterol Sulfate 108 (90 Base) MCG/ACT AEROSOL POWDER, BREATH ACTIVATED; Inhale 1-2 Puffs by mouth every 6 hours as needed (coughing, wheezing).  Dispense: 1 Each; Refill: 0  - beclomethasone HFA (QVAR REDIHALER) 80 MCG/ACT inhaler; Inhale 1 Puff by mouth 2 times a day.  Dispense: 1 Inhaler; Refill: 0  F/U PCP next week as planned  2. Cough  - DX-CHEST-2 VIEWS; per radiologist:  The heart is normal in size.  No pulmonary infiltrates or consolidations are noted.  No pleural effusions are appreciated.  - benzonatate (TESSALON) 100 MG Cap; Take 1 Cap by mouth 3 times a day as needed for Cough.  Dispense: 20 Cap; Refill: 0

## 2019-10-03 DIAGNOSIS — E78.5 DYSLIPIDEMIA: Chronic | ICD-10-CM

## 2019-10-04 DIAGNOSIS — J20.9 BRONCHITIS WITH BRONCHOSPASM: ICD-10-CM

## 2019-10-04 RX ORDER — LIRAGLUTIDE 6 MG/ML
1.8 INJECTION SUBCUTANEOUS DAILY
Qty: 27 ML | Refills: 0 | Status: SHIPPED | OUTPATIENT
Start: 2019-10-04 | End: 2020-03-19

## 2019-10-04 RX ORDER — LISINOPRIL 40 MG/1
TABLET ORAL
Qty: 90 TAB | Refills: 0 | Status: SHIPPED | OUTPATIENT
Start: 2019-10-04 | End: 2019-10-24 | Stop reason: SDUPTHER

## 2019-10-04 RX ORDER — SIMVASTATIN 40 MG
TABLET ORAL
Qty: 90 TAB | Refills: 0 | Status: SHIPPED | OUTPATIENT
Start: 2019-10-04 | End: 2019-10-24 | Stop reason: SDUPTHER

## 2019-10-04 RX ORDER — ALBUTEROL SULFATE 90 UG/1
AEROSOL, METERED RESPIRATORY (INHALATION)
Qty: 54 G | Refills: 11 | Status: SHIPPED | OUTPATIENT
Start: 2019-10-04

## 2019-10-04 RX ORDER — EMPAGLIFLOZIN 25 MG/1
TABLET, FILM COATED ORAL
Qty: 90 TAB | Refills: 0 | Status: SHIPPED | OUTPATIENT
Start: 2019-10-04 | End: 2019-10-24 | Stop reason: SDUPTHER

## 2019-10-17 ENCOUNTER — HOSPITAL ENCOUNTER (OUTPATIENT)
Dept: LAB | Facility: MEDICAL CENTER | Age: 54
End: 2019-10-17
Attending: INTERNAL MEDICINE
Payer: COMMERCIAL

## 2019-10-17 DIAGNOSIS — E11.9 CONTROLLED TYPE 2 DIABETES MELLITUS WITHOUT COMPLICATION, WITH LONG-TERM CURRENT USE OF INSULIN (HCC): ICD-10-CM

## 2019-10-17 DIAGNOSIS — Z12.5 SCREENING PSA (PROSTATE SPECIFIC ANTIGEN): ICD-10-CM

## 2019-10-17 DIAGNOSIS — Z79.4 CONTROLLED TYPE 2 DIABETES MELLITUS WITHOUT COMPLICATION, WITH LONG-TERM CURRENT USE OF INSULIN (HCC): ICD-10-CM

## 2019-10-17 DIAGNOSIS — E78.5 DYSLIPIDEMIA: Chronic | ICD-10-CM

## 2019-10-17 LAB
ALBUMIN SERPL BCP-MCNC: 4.7 G/DL (ref 3.2–4.9)
ALBUMIN/GLOB SERPL: 1.9 G/DL
ALP SERPL-CCNC: 62 U/L (ref 30–99)
ALT SERPL-CCNC: 26 U/L (ref 2–50)
ANION GAP SERPL CALC-SCNC: 13 MMOL/L (ref 0–11.9)
AST SERPL-CCNC: 19 U/L (ref 12–45)
BILIRUB SERPL-MCNC: 0.6 MG/DL (ref 0.1–1.5)
BUN SERPL-MCNC: 12 MG/DL (ref 8–22)
CALCIUM SERPL-MCNC: 9.5 MG/DL (ref 8.5–10.5)
CHLORIDE SERPL-SCNC: 102 MMOL/L (ref 96–112)
CHOLEST SERPL-MCNC: 174 MG/DL (ref 100–199)
CO2 SERPL-SCNC: 22 MMOL/L (ref 20–33)
CREAT SERPL-MCNC: 0.86 MG/DL (ref 0.5–1.4)
EST. AVERAGE GLUCOSE BLD GHB EST-MCNC: 249 MG/DL
FASTING STATUS PATIENT QL REPORTED: NORMAL
GLOBULIN SER CALC-MCNC: 2.5 G/DL (ref 1.9–3.5)
GLUCOSE SERPL-MCNC: 167 MG/DL (ref 65–99)
HBA1C MFR BLD: 10.3 % (ref 0–5.6)
HDLC SERPL-MCNC: 38 MG/DL
LDLC SERPL CALC-MCNC: 102 MG/DL
POTASSIUM SERPL-SCNC: 4.2 MMOL/L (ref 3.6–5.5)
PROT SERPL-MCNC: 7.2 G/DL (ref 6–8.2)
SODIUM SERPL-SCNC: 137 MMOL/L (ref 135–145)
TRIGL SERPL-MCNC: 168 MG/DL (ref 0–149)

## 2019-10-17 PROCEDURE — 83036 HEMOGLOBIN GLYCOSYLATED A1C: CPT

## 2019-10-17 PROCEDURE — 84153 ASSAY OF PSA TOTAL: CPT

## 2019-10-17 PROCEDURE — 80053 COMPREHEN METABOLIC PANEL: CPT

## 2019-10-17 PROCEDURE — 36415 COLL VENOUS BLD VENIPUNCTURE: CPT

## 2019-10-17 PROCEDURE — 80061 LIPID PANEL: CPT

## 2019-10-18 LAB — PSA SERPL-MCNC: 0.97 NG/ML (ref 0–4)

## 2019-10-24 ENCOUNTER — OFFICE VISIT (OUTPATIENT)
Dept: MEDICAL GROUP | Facility: MEDICAL CENTER | Age: 54
End: 2019-10-24
Payer: COMMERCIAL

## 2019-10-24 VITALS
HEART RATE: 101 BPM | DIASTOLIC BLOOD PRESSURE: 74 MMHG | SYSTOLIC BLOOD PRESSURE: 134 MMHG | WEIGHT: 267 LBS | TEMPERATURE: 97 F | HEIGHT: 70 IN | BODY MASS INDEX: 38.22 KG/M2 | OXYGEN SATURATION: 100 %

## 2019-10-24 DIAGNOSIS — Z23 NEED FOR VACCINATION: ICD-10-CM

## 2019-10-24 DIAGNOSIS — J20.9 BRONCHITIS WITH BRONCHOSPASM: ICD-10-CM

## 2019-10-24 DIAGNOSIS — Z79.4 CONTROLLED TYPE 2 DIABETES MELLITUS WITHOUT COMPLICATION, WITH LONG-TERM CURRENT USE OF INSULIN (HCC): ICD-10-CM

## 2019-10-24 DIAGNOSIS — E11.9 CONTROLLED TYPE 2 DIABETES MELLITUS WITHOUT COMPLICATION, WITH LONG-TERM CURRENT USE OF INSULIN (HCC): ICD-10-CM

## 2019-10-24 DIAGNOSIS — F32.1 CURRENT MODERATE EPISODE OF MAJOR DEPRESSIVE DISORDER WITHOUT PRIOR EPISODE (HCC): ICD-10-CM

## 2019-10-24 DIAGNOSIS — J45.20 MILD INTERMITTENT ASTHMA WITHOUT COMPLICATION: ICD-10-CM

## 2019-10-24 DIAGNOSIS — E66.9 OBESITY (BMI 30-39.9): ICD-10-CM

## 2019-10-24 DIAGNOSIS — Z11.59 NEED FOR HEPATITIS C SCREENING TEST: ICD-10-CM

## 2019-10-24 DIAGNOSIS — E78.5 DYSLIPIDEMIA: Chronic | ICD-10-CM

## 2019-10-24 PROCEDURE — 99214 OFFICE O/P EST MOD 30 MIN: CPT | Mod: 25 | Performed by: INTERNAL MEDICINE

## 2019-10-24 PROCEDURE — 90686 IIV4 VACC NO PRSV 0.5 ML IM: CPT | Performed by: INTERNAL MEDICINE

## 2019-10-24 PROCEDURE — 90471 IMMUNIZATION ADMIN: CPT | Performed by: INTERNAL MEDICINE

## 2019-10-24 RX ORDER — LISINOPRIL 40 MG/1
40 TABLET ORAL DAILY
Qty: 90 TAB | Refills: 3 | Status: SHIPPED | OUTPATIENT
Start: 2019-10-24 | End: 2020-03-19 | Stop reason: SDUPTHER

## 2019-10-24 RX ORDER — SIMVASTATIN 40 MG
40 TABLET ORAL EVERY EVENING
Qty: 90 TAB | Refills: 0 | Status: SHIPPED | OUTPATIENT
Start: 2019-10-24 | End: 2020-03-19 | Stop reason: SDUPTHER

## 2019-10-24 RX ORDER — MIRTAZAPINE 15 MG/1
15 TABLET, FILM COATED ORAL
Qty: 90 TAB | Refills: 3 | Status: SHIPPED | OUTPATIENT
Start: 2019-10-24 | End: 2020-04-02 | Stop reason: SDUPTHER

## 2019-10-24 ASSESSMENT — PATIENT HEALTH QUESTIONNAIRE - PHQ9
SUM OF ALL RESPONSES TO PHQ QUESTIONS 1-9: 12
CLINICAL INTERPRETATION OF PHQ2 SCORE: 3
5. POOR APPETITE OR OVEREATING: 2 - MORE THAN HALF THE DAYS

## 2019-10-24 NOTE — PROGRESS NOTES
CC: Follow-up diabetes, obesity, depression.                                                                                                                                      HPI:   Avinash presents today with the following.    1. Controlled type 2 diabetes mellitus without complication, with long-term current use of insulin (HCC)  Presents A1c is climbed to 10.3.  He reports his insurance is no longer taking Victoza and therefore is not taking it for 3 months.  He is well overdue for recheck of diabetes.  He is compliant with metformin and Jardiance.    2. Obesity (BMI 30-39.9)  Weight has trended up slightly over the last few months again he does report increased appetite.    3. Current moderate episode of major depressive disorder without prior episode (HCC)  He is having difficulty with stressors at work at home having mild depressive symptoms as well as difficulty sleeping.  He reports there is hopefully an end in sight to his current stressors in the next 1 to 2 months.    Depression Screening    Little interest or pleasure in doing things?  0 - not at all   Feeling down, depressed , or hopeless? 3 - nearly every day   Trouble falling or staying asleep, or sleeping too much?  3 - nearly every day   Feeling tired or having little energy?  2 - more than half the days   Poor appetite or overeating?  2 - more than half the days   Feeling bad about yourself - or that you are a failure or have let yourself or your family down? 0 - not at all   Trouble concentrating on things, such as reading the newspaper or watching television? 2 - more than half the days   Moving or speaking so slowly that other people could have noticed.  Or the opposite - being so fidgety or restless that you have been moving around a lot more than usual?  0 - not at all   Thoughts that you would be better off dead, or of hurting yourself?  0 - not at all   Patient Health Questionnaire Score: 12       If depressive symptoms identified  deferred to follow up visit unless specifically addressed in assesment and plan.    Interpretation of PHQ-9 Total Score   Score Severity   1-4 No Depression   5-9 Mild Depression   10-14 Moderate Depression   15-19 Moderately Severe Depression   20-27 Severe Depression       4. Dyslipidemia  Maintain on statin without myalgia well-controlled LDL.        Patient Active Problem List    Diagnosis Date Noted   • Benign prostatic hyperplasia with urinary frequency 05/23/2018   • Erectile dysfunction due to arterial insufficiency 11/18/2016   • Obesity (BMI 30-39.9) 10/13/2015   • AR (allergic rhinitis) 07/11/2012   • HTN (hypertension), benign 03/05/2012   • Controlled type 2 diabetes mellitus without complication, with long-term current use of insulin (HCC) 04/30/2010   • Dyslipidemia 04/30/2010   • LAMAR (obstructive sleep apnea) 04/30/2010       Current Outpatient Medications   Medication Sig Dispense Refill   • Dulaglutide (TRULICITY) 1.5 MG/0.5ML Solution Pen-injector Inject 1.5 mg as instructed every 7 days. 4 PEN 11   • simvastatin (ZOCOR) 40 MG Tab Take 1 Tab by mouth every evening. 90 Tab 0   • lisinopril (PRINIVIL, ZESTRIL) 40 MG tablet Take 1 Tab by mouth every day. TAKE 1 TABLET BY MOUTH DAILY 90 Tab 3   • Empagliflozin (JARDIANCE) 25 MG Tab Take 25 mg by mouth every day. TAKE 1 TABLET BY MOUTH DAILY 90 Tab 3   • metformin (GLUCOPHAGE) 1000 MG tablet Take 1 Tab by mouth 2 times a day. 180 Tab 3   • beclomethasone HFA (QVAR REDIHALER) 80 MCG/ACT inhaler Inhale 1 Puff by mouth 2 times a day. 1 Inhaler 0   • mirtazapine (REMERON) 15 MG Tab Take 1 Tab by mouth every bedtime. 90 Tab 3   • VICTOZA 18 MG/3ML Solution Pen-injector INJECT 0.3 ML AS INSTRUCTED EVERY DAY. 27 mL 0   • VENTOLIN  (90 Base) MCG/ACT Aero Soln inhalation aerosol USE 2 PUFFS EVERY 6 HOURS  AS NEEDED FOR SHORTNESS OF  BREATH 54 g 11   • B-D UF III MINI PEN NEEDLES 31G X 5 MM Misc USE 1 EACH EVERY DAY 90 Each 11   • ONETOUCH VERIO strip     "  • Blood Glucose Monitoring Suppl SUPPLIES Misc Test strips order: Test strips for One Touch Verio IQ meter. Sig: use bid and prn ssx high or low sugar #100 RF x 11 100 Each 11   • Albuterol Sulfate 108 (90 Base) MCG/ACT AEROSOL POWDER, BREATH ACTIVATED Inhale 1-2 Puffs by mouth every 6 hours as needed (coughing, wheezing). (Patient not taking: Reported on 10/24/2019) 1 Each 0     No current facility-administered medications for this visit.          Allergies as of 10/24/2019 - Reviewed 10/24/2019   Allergen Reaction Noted   • Codeine Anaphylaxis 04/30/2010        ROS: Denies Chest pain, SOB, LE edema.    /74 (BP Location: Right arm, Patient Position: Sitting)   Pulse (!) 101   Temp 36.1 °C (97 °F)   Ht 1.778 m (5' 10\")   Wt 121.1 kg (267 lb)   SpO2 100%   BMI 38.31 kg/m²     Physical Exam:  Gen:         Alert and oriented, No apparent distress.  Neck:        No Lymphadenopathy or Bruits.  Lungs:     Clear to auscultation bilaterally  CV:          Regular rate and rhythm. No murmurs, rubs or gallops.               Ext:          No clubbing, cyanosis, edema.      Assessment and Plan.   53 y.o. male with the following issues.    1. Controlled type 2 diabetes mellitus without complication, with long-term current use of insulin (HCC)  Starting on Trulicity once weekly that does appear to be paid for cautioned about side effects recheck in 3 months.  - Diabetic Monofilament Lower Extremity Exam  - HEMOGLOBIN A1C; Future  - MICROALBUMIN CREAT RATIO URINE; Future    2. Obesity (BMI 30-39.9)  Patient's body mass index is elevated. Exercise and nutrition counseling were performed at this visit.  Set goal of 15lbs in next 3 months.  - Patient identified as having weight management issue.  Appropriate orders and counseling given.    3. Current moderate episode of major depressive disorder without prior episode (HCC)    Discussion about antidepressants he would like to wait and see with the next few months bring " have started on Remeron to try and help with sleep and may be slight mood affect as well.  We will see back in 3 months.  - Patient has been identified as having a positive depression screening. Appropriate orders and counseling have been given.  - mirtazapine (REMERON) 15 MG Tab; Take 1 Tab by mouth every bedtime.  Dispense: 90 Tab; Refill: 3    4. Dyslipidemia  Refilled statin.  - simvastatin (ZOCOR) 40 MG Tab; Take 1 Tab by mouth every evening.  Dispense: 90 Tab; Refill: 0    5.  Mild intermittent asthma  Filled inhalers  - beclomethasone HFA (QVAR REDIHALER) 80 MCG/ACT inhaler; Inhale 1 Puff by mouth 2 times a day.  Dispense: 1 Inhaler; Refill: 0    6. Need for hepatitis C screening test    - HEP C VIRUS ANTIBODY; Future    7. Need for vaccination    - Influenza Vaccine Quad Injection (PF)

## 2020-03-19 ENCOUNTER — TELEPHONE (OUTPATIENT)
Dept: HEALTH INFORMATION MANAGEMENT | Facility: OTHER | Age: 55
End: 2020-03-19

## 2020-03-19 NOTE — TELEPHONE ENCOUNTER
1. Caller Name: Avinash                        Call Back Number: [phone]  [facility] PCP or Specialty Provider: Yes Dr. Shelton        2.  Does patient have any active symptoms of respiratory illness (fever OR cough OR shortness of breath)? Yes, the patient reports the following respiratory symptoms: cough with green phlegm x 2 days, no fever, no shortness of breath.     3.  Does patient have any comoribidities? DM    4.  In the last 30 days, has the patient traveled outside of the country OR in a high risk area within the  OR have any known contact with someone who has or is suspected to have COVID-19?  No.    5. Disposition: Advised to perform self care, monitor for worsening symptoms and to call back in 3 days if no improvement he insisted in hearing back from Dr. Shelton.     Note routed to PCP: Provider action needed: pt would like you to call him back. He wasn't interested in UC or nursing recommendations for self care and monitoring and insisted I send a note for Dr. Shelton.

## 2020-03-20 DIAGNOSIS — J45.20 MILD INTERMITTENT ASTHMA WITHOUT COMPLICATION: ICD-10-CM

## 2020-04-02 DIAGNOSIS — F32.1 CURRENT MODERATE EPISODE OF MAJOR DEPRESSIVE DISORDER WITHOUT PRIOR EPISODE (HCC): ICD-10-CM

## 2020-04-02 RX ORDER — MIRTAZAPINE 15 MG/1
15 TABLET, FILM COATED ORAL
Qty: 90 TAB | Refills: 3 | Status: SHIPPED | OUTPATIENT
Start: 2020-04-02

## 2021-03-15 DIAGNOSIS — Z23 NEED FOR VACCINATION: ICD-10-CM

## 2022-11-11 NOTE — PROGRESS NOTES
CC: Follow-up diabetes and hypertension complaining of frequent urination    HPI:   Avinash presents today with the following.    1. Controlled type 2 diabetes mellitus without complication, with long-term current use of insulin ([facility])  Recent check of diabetes A1c of 8.1 reports traveling frequently as the cause of his increased numbers.  He is reports he does have a slight break from work and will try and double down on his efforts to lose weight.    2. HTN (hypertension), benign  Well-controlled on lisinopril denying any chest pain or shortness of breath    3. BMI 39.0-39.9,adult  Persistent elevations again having difficulty with travel follow-up specific diet.    4. Benign prostatic hyperplasia with urinary frequency  He is reporting frequent urination including waking up at night.  His stream is weakened as well.  He reports is not currently on lifestyle limiting factor but it is getting slightly worse with time.      Patient Active Problem List    Diagnosis Date Noted   • Benign prostatic hyperplasia with urinary frequency 05/23/2018   • Erectile dysfunction due to arterial insufficiency 11/18/2016   • BMI 39.0-39.9,adult 10/13/2015   • AR (allergic rhinitis) 07/11/2012   • HTN (hypertension), benign 03/05/2012   • Controlled type 2 diabetes mellitus without complication, with long-term current use of insulin ([facility]) 04/30/2010   • Dyslipidemia 04/30/2010   • LAMAR (obstructive sleep apnea) 04/30/2010       Current Outpatient Prescriptions   Medication Sig Dispense Refill   • lisinopril (PRINIVIL, ZESTRIL) 40 MG tablet Take 1 Tab by mouth every day. TAKE 1 TABLET BY MOUTH DAILY 90 Tab 3   • metformin (GLUCOPHAGE) 1000 MG tablet Take 1 Tab by mouth 2 times a day. 180 Tab 3   • Empagliflozin (JARDIANCE) 25 MG Tab Take 25 mg by mouth every day. TAKE 1 TABLET BY MOUTH DAILY 90 Tab 9   • VENTOLIN  (90 Base) MCG/ACT Aero Soln inhalation aerosol USE 2 PUFFS EVERY 6 HOURS  AS NEEDED FOR SHORTNESS OF  BREATH 54 g 11  "  • B-D UF III MINI PEN NEEDLES 31G X 5 MM Misc USE 1 EACH EVERY DAY 90 Each 11   • simvastatin (ZOCOR) 40 MG Tab Take 1 Tab by mouth every evening. 90 Tab 3   • ONETOUCH VERIO strip      • Blood Glucose Monitoring Suppl SUPPLIES Misc Test strips order: Test strips for One Touch Verio IQ meter. Sig: use bid and prn ssx high or low sugar #100 RF x 11 100 Each 11   • VICTOZA 18 MG/3ML Solution Pen-injector injection        No current facility-administered medications for this visit.          Allergies as of 05/23/2018 - Reviewed 05/23/2018   Allergen Reaction Noted   • Codeine Anaphylaxis 04/30/2010        ROS: Denies Chest pain, SOB, LE edema.    /62   Pulse 85   Temp 37.1 °C (98.7 °F)   Resp 16   Ht 1.778 m (5' 10\")   Wt 122.5 kg (270 lb)   SpO2 94%   BMI 38.74 kg/m²     Physical Exam:  Gen:         Alert and oriented, No apparent distress.  Neck:        No Lymphadenopathy or Bruits.  Lungs:     Clear to auscultation bilaterally  CV:          Regular rate and rhythm. No murmurs, rubs or gallops.               Ext:          No clubbing, cyanosis, edema.      Assessment and Plan.   52 y.o. male with the following issues.    1. Controlled type 2 diabetes mellitus without complication, with long-term current use of insulin (HCC)  No change to medications at this time recheck 3 months  - Diabetic Monofilament Lower Extremity Exam    2. HTN (hypertension), benign  Currently well controlled, Discuss diet, exercise and salt restriction.  No change to medication therapy.    3. BMI 39.0-39.9,adult  Patient's body mass index is 38.74 kg/m². Exercise and nutrition counseling were performed at this visit.  Set goal of 15lbs in next 3 months.      4. Benign prostatic hyperplasia with urinary frequency  Discussion about lifestyle interventions if it should worsen discussion about medication he declines at this time.      " Klisyri Counseling:  I discussed with the patient the risks of Klisyri including but not limited to erythema, scaling, itching, weeping, crusting, and pain.